# Patient Record
Sex: MALE | Race: WHITE | NOT HISPANIC OR LATINO | URBAN - METROPOLITAN AREA
[De-identification: names, ages, dates, MRNs, and addresses within clinical notes are randomized per-mention and may not be internally consistent; named-entity substitution may affect disease eponyms.]

---

## 2017-05-01 ENCOUNTER — OUTPATIENT (OUTPATIENT)
Dept: OUTPATIENT SERVICES | Facility: HOSPITAL | Age: 52
LOS: 1 days | End: 2017-05-01
Payer: COMMERCIAL

## 2017-05-01 VITALS
HEIGHT: 72 IN | HEART RATE: 65 BPM | WEIGHT: 315 LBS | RESPIRATION RATE: 16 BRPM | DIASTOLIC BLOOD PRESSURE: 70 MMHG | SYSTOLIC BLOOD PRESSURE: 130 MMHG | OXYGEN SATURATION: 98 % | TEMPERATURE: 98 F

## 2017-05-01 DIAGNOSIS — Z01.818 ENCOUNTER FOR OTHER PREPROCEDURAL EXAMINATION: ICD-10-CM

## 2017-05-01 DIAGNOSIS — S46.001A UNSPECIFIED INJURY OF MUSCLE(S) AND TENDON(S) OF THE ROTATOR CUFF OF RIGHT SHOULDER, INITIAL ENCOUNTER: ICD-10-CM

## 2017-05-01 DIAGNOSIS — S46.002A UNSPECIFIED INJURY OF MUSCLE(S) AND TENDON(S) OF THE ROTATOR CUFF OF LEFT SHOULDER, INITIAL ENCOUNTER: ICD-10-CM

## 2017-05-01 DIAGNOSIS — Z98.890 OTHER SPECIFIED POSTPROCEDURAL STATES: Chronic | ICD-10-CM

## 2017-05-01 PROCEDURE — 85027 COMPLETE CBC AUTOMATED: CPT

## 2017-05-01 PROCEDURE — 80048 BASIC METABOLIC PNL TOTAL CA: CPT

## 2017-05-01 PROCEDURE — 85610 PROTHROMBIN TIME: CPT

## 2017-05-01 PROCEDURE — G0463: CPT

## 2017-05-01 PROCEDURE — 85730 THROMBOPLASTIN TIME PARTIAL: CPT

## 2017-05-01 NOTE — H&P PST ADULT - DOES PATIENT HAVE ADVANCE DIRECTIVE
No/in case of emergency call sister Dunia [phone], she is to make medical decisions about patient

## 2017-05-01 NOTE — H&P PST ADULT - NSANTHOSAYNRD_GEN_A_CORE
pt advised to see pulmonology to be assessed for sleep apnea/No. CAMILLE screening performed.  STOP BANG Legend: 0-2 = LOW Risk; 3-4 = INTERMEDIATE Risk; 5-8 = HIGH Risk pt advised to see pulmonology to be assessed for sleep apnea, pt seen By Anesthesia, Dr. Lee, she discussed risk factors for CAMILLE with patient/No. CAMILLE screening performed.  STOP BANG Legend: 0-2 = LOW Risk; 3-4 = INTERMEDIATE Risk; 5-8 = HIGH Risk

## 2017-05-01 NOTE — H&P PST ADULT - PMH
Gout    HLD (hyperlipidemia)    Hypertension    Morbid obesity with BMI of 45.0-49.9, adult    CAMILLE (obstructive sleep apnea)  SANDIP RISK FOR  Vitamin B12 deficiency    Vitamin D deficiency

## 2017-05-01 NOTE — H&P PST ADULT - MUSCULOSKELETAL
details… detailed exam no joint swelling/no calf tenderness/decreased ROM due to pain/no joint warmth/no joint erythema/right shoulder

## 2017-05-01 NOTE — H&P PST ADULT - PROBLEM SELECTOR PLAN 1
Patient is scheduled for right shoulder arthroscopy and right shoulder rotator cuff repair on 5/4/17. Patient is at moderate risk for this surgery.

## 2017-05-01 NOTE — H&P PST ADULT - HISTORY OF PRESENT ILLNESS
52 years old male presented with right shoulder pain and limited ROM , s/p fall at work on December 2016. Diagnosed with injury of muscles and tendons of  right rotator cuff and is  scheduled for right shoulder arthroscopy and right shoulder rotator cuff repair on 5/4/17.

## 2017-05-03 RX ORDER — CELECOXIB 200 MG/1
200 CAPSULE ORAL ONCE
Qty: 0 | Refills: 0 | Status: COMPLETED | OUTPATIENT
Start: 2017-05-04 | End: 2017-05-04

## 2017-05-03 RX ORDER — ACETAMINOPHEN 500 MG
975 TABLET ORAL ONCE
Qty: 0 | Refills: 0 | Status: COMPLETED | OUTPATIENT
Start: 2017-05-04 | End: 2017-05-04

## 2017-05-03 RX ORDER — SODIUM CHLORIDE 9 MG/ML
3 INJECTION INTRAMUSCULAR; INTRAVENOUS; SUBCUTANEOUS EVERY 8 HOURS
Qty: 0 | Refills: 0 | Status: DISCONTINUED | OUTPATIENT
Start: 2017-05-04 | End: 2017-05-04

## 2017-05-04 ENCOUNTER — INPATIENT (INPATIENT)
Facility: HOSPITAL | Age: 52
LOS: 0 days | Discharge: HOME | DRG: 309 | End: 2017-05-05
Attending: INTERNAL MEDICINE | Admitting: INTERNAL MEDICINE
Payer: COMMERCIAL

## 2017-05-04 VITALS
OXYGEN SATURATION: 99 % | WEIGHT: 315 LBS | DIASTOLIC BLOOD PRESSURE: 94 MMHG | RESPIRATION RATE: 18 BRPM | HEART RATE: 78 BPM | HEIGHT: 72 IN | SYSTOLIC BLOOD PRESSURE: 138 MMHG | TEMPERATURE: 98 F

## 2017-05-04 DIAGNOSIS — I10 ESSENTIAL (PRIMARY) HYPERTENSION: ICD-10-CM

## 2017-05-04 DIAGNOSIS — S46.001A UNSPECIFIED INJURY OF MUSCLE(S) AND TENDON(S) OF THE ROTATOR CUFF OF RIGHT SHOULDER, INITIAL ENCOUNTER: ICD-10-CM

## 2017-05-04 DIAGNOSIS — M75.100 UNSPECIFIED ROTATOR CUFF TEAR OR RUPTURE OF UNSPECIFIED SHOULDER, NOT SPECIFIED AS TRAUMATIC: ICD-10-CM

## 2017-05-04 DIAGNOSIS — Z01.818 ENCOUNTER FOR OTHER PREPROCEDURAL EXAMINATION: ICD-10-CM

## 2017-05-04 DIAGNOSIS — Z29.9 ENCOUNTER FOR PROPHYLACTIC MEASURES, UNSPECIFIED: ICD-10-CM

## 2017-05-04 DIAGNOSIS — I48.91 UNSPECIFIED ATRIAL FIBRILLATION: ICD-10-CM

## 2017-05-04 DIAGNOSIS — Z98.890 OTHER SPECIFIED POSTPROCEDURAL STATES: Chronic | ICD-10-CM

## 2017-05-04 DIAGNOSIS — E78.5 HYPERLIPIDEMIA, UNSPECIFIED: ICD-10-CM

## 2017-05-04 LAB
ALBUMIN SERPL ELPH-MCNC: 3.7 G/DL — SIGNIFICANT CHANGE UP (ref 3.5–5)
ALP SERPL-CCNC: 55 U/L — SIGNIFICANT CHANGE UP (ref 40–120)
ALT FLD-CCNC: 26 U/L DA — SIGNIFICANT CHANGE UP (ref 10–60)
ANION GAP SERPL CALC-SCNC: 8 MMOL/L — SIGNIFICANT CHANGE UP (ref 5–17)
AST SERPL-CCNC: 18 U/L — SIGNIFICANT CHANGE UP (ref 10–40)
BILIRUB SERPL-MCNC: 1.1 MG/DL — SIGNIFICANT CHANGE UP (ref 0.2–1.2)
BUN SERPL-MCNC: 15 MG/DL — SIGNIFICANT CHANGE UP (ref 7–18)
CALCIUM SERPL-MCNC: 9.2 MG/DL — SIGNIFICANT CHANGE UP (ref 8.4–10.5)
CHLORIDE SERPL-SCNC: 104 MMOL/L — SIGNIFICANT CHANGE UP (ref 96–108)
CK MB BLD-MCNC: 1.2 % — SIGNIFICANT CHANGE UP (ref 0–3.5)
CK MB CFR SERPL CALC: 1.4 NG/ML — SIGNIFICANT CHANGE UP (ref 0–3.6)
CK SERPL-CCNC: 115 U/L — SIGNIFICANT CHANGE UP (ref 35–232)
CO2 SERPL-SCNC: 26 MMOL/L — SIGNIFICANT CHANGE UP (ref 22–31)
CREAT SERPL-MCNC: 1.11 MG/DL — SIGNIFICANT CHANGE UP (ref 0.5–1.3)
GLUCOSE SERPL-MCNC: 134 MG/DL — HIGH (ref 70–99)
HCT VFR BLD CALC: 40.6 % — SIGNIFICANT CHANGE UP (ref 39–50)
HGB BLD-MCNC: 14.1 G/DL — SIGNIFICANT CHANGE UP (ref 13–17)
MAGNESIUM SERPL-MCNC: 1.8 MG/DL — SIGNIFICANT CHANGE UP (ref 1.8–2.4)
MCHC RBC-ENTMCNC: 32.3 PG — SIGNIFICANT CHANGE UP (ref 27–34)
MCHC RBC-ENTMCNC: 34.7 GM/DL — SIGNIFICANT CHANGE UP (ref 32–36)
MCV RBC AUTO: 93 FL — SIGNIFICANT CHANGE UP (ref 80–100)
PLATELET # BLD AUTO: 197 K/UL — SIGNIFICANT CHANGE UP (ref 150–400)
POTASSIUM SERPL-MCNC: 3.6 MMOL/L — SIGNIFICANT CHANGE UP (ref 3.5–5.3)
POTASSIUM SERPL-SCNC: 3.6 MMOL/L — SIGNIFICANT CHANGE UP (ref 3.5–5.3)
PROT SERPL-MCNC: 7.3 G/DL — SIGNIFICANT CHANGE UP (ref 6–8.3)
RBC # BLD: 4.36 M/UL — SIGNIFICANT CHANGE UP (ref 4.2–5.8)
RBC # FLD: 12.1 % — SIGNIFICANT CHANGE UP (ref 10.3–14.5)
SODIUM SERPL-SCNC: 138 MMOL/L — SIGNIFICANT CHANGE UP (ref 135–145)
TROPONIN I SERPL-MCNC: <0.015 NG/ML — SIGNIFICANT CHANGE UP (ref 0–0.04)
TROPONIN I SERPL-MCNC: <0.015 NG/ML — SIGNIFICANT CHANGE UP (ref 0–0.04)
TSH SERPL-MCNC: 1.67 UU/ML — SIGNIFICANT CHANGE UP (ref 0.34–4.82)
WBC # BLD: 9.3 K/UL — SIGNIFICANT CHANGE UP (ref 3.8–10.5)
WBC # FLD AUTO: 9.3 K/UL — SIGNIFICANT CHANGE UP (ref 3.8–10.5)

## 2017-05-04 RX ORDER — METOPROLOL TARTRATE 50 MG
5 TABLET ORAL EVERY 6 HOURS
Qty: 0 | Refills: 0 | Status: DISCONTINUED | OUTPATIENT
Start: 2017-05-04 | End: 2017-05-04

## 2017-05-04 RX ORDER — METOPROLOL TARTRATE 50 MG
5 TABLET ORAL ONCE
Qty: 0 | Refills: 0 | Status: COMPLETED | OUTPATIENT
Start: 2017-05-04 | End: 2017-05-04

## 2017-05-04 RX ORDER — ALLOPURINOL 300 MG
300 TABLET ORAL DAILY
Qty: 0 | Refills: 0 | Status: DISCONTINUED | OUTPATIENT
Start: 2017-05-04 | End: 2017-05-05

## 2017-05-04 RX ORDER — AMLODIPINE BESYLATE 2.5 MG/1
10 TABLET ORAL DAILY
Qty: 0 | Refills: 0 | Status: DISCONTINUED | OUTPATIENT
Start: 2017-05-04 | End: 2017-05-05

## 2017-05-04 RX ORDER — METOPROLOL TARTRATE 50 MG
25 TABLET ORAL EVERY 8 HOURS
Qty: 0 | Refills: 0 | Status: DISCONTINUED | OUTPATIENT
Start: 2017-05-04 | End: 2017-05-05

## 2017-05-04 RX ORDER — SODIUM CHLORIDE 9 MG/ML
1000 INJECTION, SOLUTION INTRAVENOUS
Qty: 0 | Refills: 0 | Status: DISCONTINUED | OUTPATIENT
Start: 2017-05-04 | End: 2017-05-04

## 2017-05-04 RX ORDER — ASPIRIN/CALCIUM CARB/MAGNESIUM 324 MG
81 TABLET ORAL DAILY
Qty: 0 | Refills: 0 | Status: DISCONTINUED | OUTPATIENT
Start: 2017-05-04 | End: 2017-05-04

## 2017-05-04 RX ORDER — DIGOXIN 250 MCG
0.5 TABLET ORAL ONCE
Qty: 0 | Refills: 0 | Status: COMPLETED | OUTPATIENT
Start: 2017-05-04 | End: 2017-05-04

## 2017-05-04 RX ORDER — ENOXAPARIN SODIUM 100 MG/ML
160 INJECTION SUBCUTANEOUS EVERY 12 HOURS
Qty: 0 | Refills: 0 | Status: DISCONTINUED | OUTPATIENT
Start: 2017-05-04 | End: 2017-05-04

## 2017-05-04 RX ORDER — ENOXAPARIN SODIUM 100 MG/ML
40 INJECTION SUBCUTANEOUS DAILY
Qty: 0 | Refills: 0 | Status: DISCONTINUED | OUTPATIENT
Start: 2017-05-04 | End: 2017-05-04

## 2017-05-04 RX ORDER — LISINOPRIL 2.5 MG/1
20 TABLET ORAL DAILY
Qty: 0 | Refills: 0 | Status: DISCONTINUED | OUTPATIENT
Start: 2017-05-04 | End: 2017-05-05

## 2017-05-04 RX ORDER — DIGOXIN 250 MCG
0.25 TABLET ORAL DAILY
Qty: 0 | Refills: 0 | Status: DISCONTINUED | OUTPATIENT
Start: 2017-05-05 | End: 2017-05-05

## 2017-05-04 RX ORDER — ASPIRIN/CALCIUM CARB/MAGNESIUM 324 MG
81 TABLET ORAL DAILY
Qty: 0 | Refills: 0 | Status: DISCONTINUED | OUTPATIENT
Start: 2017-05-04 | End: 2017-05-05

## 2017-05-04 RX ORDER — DIGOXIN 250 MCG
0.25 TABLET ORAL EVERY 6 HOURS
Qty: 0 | Refills: 0 | Status: COMPLETED | OUTPATIENT
Start: 2017-05-04 | End: 2017-05-05

## 2017-05-04 RX ORDER — ENOXAPARIN SODIUM 100 MG/ML
150 INJECTION SUBCUTANEOUS EVERY 12 HOURS
Qty: 0 | Refills: 0 | Status: DISCONTINUED | OUTPATIENT
Start: 2017-05-04 | End: 2017-05-05

## 2017-05-04 RX ADMIN — Medication 25 MILLIGRAM(S): at 21:11

## 2017-05-04 RX ADMIN — Medication 975 MILLIGRAM(S): at 07:22

## 2017-05-04 RX ADMIN — ENOXAPARIN SODIUM 150 MILLIGRAM(S): 100 INJECTION SUBCUTANEOUS at 16:41

## 2017-05-04 RX ADMIN — Medication 81 MILLIGRAM(S): at 16:41

## 2017-05-04 RX ADMIN — Medication 300 MILLIGRAM(S): at 21:47

## 2017-05-04 RX ADMIN — CELECOXIB 200 MILLIGRAM(S): 200 CAPSULE ORAL at 07:22

## 2017-05-04 RX ADMIN — Medication 5 MILLIGRAM(S): at 10:25

## 2017-05-04 RX ADMIN — Medication 25 MILLIGRAM(S): at 16:41

## 2017-05-04 RX ADMIN — SODIUM CHLORIDE 3 MILLILITER(S): 9 INJECTION INTRAMUSCULAR; INTRAVENOUS; SUBCUTANEOUS at 07:22

## 2017-05-04 RX ADMIN — Medication 0.25 MILLIGRAM(S): at 21:47

## 2017-05-04 RX ADMIN — Medication 0.5 MILLIGRAM(S): at 16:41

## 2017-05-04 NOTE — H&P ADULT. - HISTORY OF PRESENT ILLNESS
52 M from home, lives alone , PMH of arthritis , HLD , HTN, obesity admitted for ambulatory surgery for arthroscopy of right shoulder  for  rotator cuff tear ( s/p fall in december ). In OR pt found to have new onset Afib with -150 in OR and /105. Pt reported he has had feeling of flutter in chest in the past as well, not related to any activity. Denied any exertional chest pain or SOB. He had Echo and ECG done with the PCP prior to procedure for clearance.   He is a non smoker, second hand smoking till age of 26.

## 2017-05-04 NOTE — H&P ADULT. - PROBLEM SELECTOR PLAN 1
atrial fibrillation with RVR   Echo done at PMD office showed EF 55-60%   Will start lopressor 5 mg IV Q6 for rate control   TONIA-vasc score of 1, continue with aspirin atrial fibrillation with RVR   Echo done at PMD office showed EF 55-60%   Will start lopressor 5 mg IV Q6 for rate control   TONIA-vasc score of 1, continue with aspirin  Addendum : D/w Dr. Rodriguez , starting lopressor 25 Q8 PO, digoxin loading   full dose anticoagulation with Lovenox , stress test in am

## 2017-05-04 NOTE — H&P ADULT. - ASSESSMENT
52 M from home, lives alone , PMH of arthritis , HLD , HTN, obesity admitted for ambulatory surgery for arthroscopy of right shoulder  for  rotator cuff tear ( s/p fall in december ). In OR pt found to have new onset Afib with -150 in OR and /105. Pt reported he has had feeling of flutter in chest in the past as well, not related to any activity. Denied any exertional chest pain or SOB. He had Echo and ECG done with the PCP prior to procedure for clearance.

## 2017-05-04 NOTE — H&P ADULT. - PROBLEM SELECTOR PLAN 3
Out pt cholesterol level of 209 as per pt   On diet control , not on any medication   follow up lipid profile

## 2017-05-05 VITALS
HEART RATE: 93 BPM | DIASTOLIC BLOOD PRESSURE: 88 MMHG | TEMPERATURE: 98 F | SYSTOLIC BLOOD PRESSURE: 129 MMHG | OXYGEN SATURATION: 98 % | RESPIRATION RATE: 18 BRPM

## 2017-05-05 LAB
24R-OH-CALCIDIOL SERPL-MCNC: 9.1 NG/ML — LOW (ref 30–100)
ALBUMIN SERPL ELPH-MCNC: 3.4 G/DL — LOW (ref 3.5–5)
ALP SERPL-CCNC: 50 U/L — SIGNIFICANT CHANGE UP (ref 40–120)
ALT FLD-CCNC: 27 U/L DA — SIGNIFICANT CHANGE UP (ref 10–60)
ANION GAP SERPL CALC-SCNC: 8 MMOL/L — SIGNIFICANT CHANGE UP (ref 5–17)
AST SERPL-CCNC: 22 U/L — SIGNIFICANT CHANGE UP (ref 10–40)
BASOPHILS # BLD AUTO: 0.1 K/UL — SIGNIFICANT CHANGE UP (ref 0–0.2)
BASOPHILS NFR BLD AUTO: 0.8 % — SIGNIFICANT CHANGE UP (ref 0–2)
BILIRUB SERPL-MCNC: 1.3 MG/DL — HIGH (ref 0.2–1.2)
BUN SERPL-MCNC: 13 MG/DL — SIGNIFICANT CHANGE UP (ref 7–18)
CALCIUM SERPL-MCNC: 8.9 MG/DL — SIGNIFICANT CHANGE UP (ref 8.4–10.5)
CHLORIDE SERPL-SCNC: 102 MMOL/L — SIGNIFICANT CHANGE UP (ref 96–108)
CHOLEST SERPL-MCNC: 238 MG/DL — HIGH (ref 10–199)
CK MB BLD-MCNC: 1.2 % — SIGNIFICANT CHANGE UP (ref 0–3.5)
CK MB CFR SERPL CALC: 3.1 NG/ML — SIGNIFICANT CHANGE UP (ref 0–3.6)
CK SERPL-CCNC: 263 U/L — HIGH (ref 35–232)
CO2 SERPL-SCNC: 27 MMOL/L — SIGNIFICANT CHANGE UP (ref 22–31)
CREAT SERPL-MCNC: 0.86 MG/DL — SIGNIFICANT CHANGE UP (ref 0.5–1.3)
EOSINOPHIL # BLD AUTO: 0.1 K/UL — SIGNIFICANT CHANGE UP (ref 0–0.5)
EOSINOPHIL NFR BLD AUTO: 2 % — SIGNIFICANT CHANGE UP (ref 0–6)
GLUCOSE SERPL-MCNC: 120 MG/DL — HIGH (ref 70–99)
HBA1C BLD-MCNC: 5.8 % — HIGH (ref 4–5.6)
HCT VFR BLD CALC: 38.7 % — LOW (ref 39–50)
HDLC SERPL-MCNC: 30 MG/DL — LOW (ref 40–125)
HGB BLD-MCNC: 13.3 G/DL — SIGNIFICANT CHANGE UP (ref 13–17)
LIPID PNL WITH DIRECT LDL SERPL: 108 MG/DL — SIGNIFICANT CHANGE UP
LYMPHOCYTES # BLD AUTO: 1.8 K/UL — SIGNIFICANT CHANGE UP (ref 1–3.3)
LYMPHOCYTES # BLD AUTO: 26.4 % — SIGNIFICANT CHANGE UP (ref 13–44)
MAGNESIUM SERPL-MCNC: 2.1 MG/DL — SIGNIFICANT CHANGE UP (ref 1.8–2.4)
MCHC RBC-ENTMCNC: 32.2 PG — SIGNIFICANT CHANGE UP (ref 27–34)
MCHC RBC-ENTMCNC: 34.5 GM/DL — SIGNIFICANT CHANGE UP (ref 32–36)
MCV RBC AUTO: 93.4 FL — SIGNIFICANT CHANGE UP (ref 80–100)
MONOCYTES # BLD AUTO: 0.6 K/UL — SIGNIFICANT CHANGE UP (ref 0–0.9)
MONOCYTES NFR BLD AUTO: 9.1 % — SIGNIFICANT CHANGE UP (ref 2–14)
NEUTROPHILS # BLD AUTO: 4.1 K/UL — SIGNIFICANT CHANGE UP (ref 1.8–7.4)
NEUTROPHILS NFR BLD AUTO: 61.7 % — SIGNIFICANT CHANGE UP (ref 43–77)
PHOSPHATE SERPL-MCNC: 3 MG/DL — SIGNIFICANT CHANGE UP (ref 2.5–4.5)
PLATELET # BLD AUTO: 163 K/UL — SIGNIFICANT CHANGE UP (ref 150–400)
POTASSIUM SERPL-MCNC: 3.7 MMOL/L — SIGNIFICANT CHANGE UP (ref 3.5–5.3)
POTASSIUM SERPL-SCNC: 3.7 MMOL/L — SIGNIFICANT CHANGE UP (ref 3.5–5.3)
PROT SERPL-MCNC: 6.8 G/DL — SIGNIFICANT CHANGE UP (ref 6–8.3)
RBC # BLD: 4.14 M/UL — LOW (ref 4.2–5.8)
RBC # FLD: 12.2 % — SIGNIFICANT CHANGE UP (ref 10.3–14.5)
SODIUM SERPL-SCNC: 137 MMOL/L — SIGNIFICANT CHANGE UP (ref 135–145)
TOTAL CHOLESTEROL/HDL RATIO MEASUREMENT: 7.9 RATIO — SIGNIFICANT CHANGE UP (ref 3.4–9.6)
TRIGL SERPL-MCNC: 498 MG/DL — HIGH (ref 10–149)
TROPONIN I SERPL-MCNC: <0.015 NG/ML — SIGNIFICANT CHANGE UP (ref 0–0.04)
TSH SERPL-MCNC: 2.1 UU/ML — SIGNIFICANT CHANGE UP (ref 0.34–4.82)
WBC # BLD: 6.7 K/UL — SIGNIFICANT CHANGE UP (ref 3.8–10.5)
WBC # FLD AUTO: 6.7 K/UL — SIGNIFICANT CHANGE UP (ref 3.8–10.5)

## 2017-05-05 PROCEDURE — 93005 ELECTROCARDIOGRAM TRACING: CPT

## 2017-05-05 PROCEDURE — 82553 CREATINE MB FRACTION: CPT

## 2017-05-05 PROCEDURE — 84484 ASSAY OF TROPONIN QUANT: CPT

## 2017-05-05 PROCEDURE — 82306 VITAMIN D 25 HYDROXY: CPT

## 2017-05-05 PROCEDURE — 83036 HEMOGLOBIN GLYCOSYLATED A1C: CPT

## 2017-05-05 PROCEDURE — 84100 ASSAY OF PHOSPHORUS: CPT

## 2017-05-05 PROCEDURE — 80053 COMPREHEN METABOLIC PANEL: CPT

## 2017-05-05 PROCEDURE — 71010: CPT | Mod: 26

## 2017-05-05 PROCEDURE — 82550 ASSAY OF CK (CPK): CPT

## 2017-05-05 PROCEDURE — 84443 ASSAY THYROID STIM HORMONE: CPT

## 2017-05-05 PROCEDURE — 83735 ASSAY OF MAGNESIUM: CPT

## 2017-05-05 PROCEDURE — 80061 LIPID PANEL: CPT

## 2017-05-05 PROCEDURE — 85027 COMPLETE CBC AUTOMATED: CPT

## 2017-05-05 PROCEDURE — 71045 X-RAY EXAM CHEST 1 VIEW: CPT

## 2017-05-05 RX ORDER — DIGOXIN 250 MCG
1 TABLET ORAL
Qty: 30 | Refills: 0 | OUTPATIENT
Start: 2017-05-05 | End: 2017-06-04

## 2017-05-05 RX ORDER — GEMFIBROZIL 600 MG
600 TABLET ORAL
Qty: 0 | Refills: 0 | Status: DISCONTINUED | OUTPATIENT
Start: 2017-05-05 | End: 2017-05-05

## 2017-05-05 RX ORDER — APIXABAN 2.5 MG/1
1 TABLET, FILM COATED ORAL
Qty: 60 | Refills: 0 | OUTPATIENT
Start: 2017-05-05 | End: 2017-06-04

## 2017-05-05 RX ORDER — ALLOPURINOL 300 MG
1 TABLET ORAL
Qty: 30 | Refills: 0 | OUTPATIENT
Start: 2017-05-05 | End: 2017-06-04

## 2017-05-05 RX ORDER — METOPROLOL TARTRATE 50 MG
50 TABLET ORAL
Qty: 0 | Refills: 0 | Status: DISCONTINUED | OUTPATIENT
Start: 2017-05-05 | End: 2017-05-05

## 2017-05-05 RX ORDER — SIMVASTATIN 20 MG/1
1 TABLET, FILM COATED ORAL
Qty: 30 | Refills: 0 | OUTPATIENT
Start: 2017-05-05 | End: 2017-06-04

## 2017-05-05 RX ORDER — METOPROLOL TARTRATE 50 MG
1 TABLET ORAL
Qty: 60 | Refills: 0 | OUTPATIENT
Start: 2017-05-05 | End: 2017-06-04

## 2017-05-05 RX ORDER — SIMVASTATIN 20 MG/1
10 TABLET, FILM COATED ORAL AT BEDTIME
Qty: 0 | Refills: 0 | Status: DISCONTINUED | OUTPATIENT
Start: 2017-05-05 | End: 2017-05-05

## 2017-05-05 RX ORDER — ALLOPURINOL 300 MG
1 TABLET ORAL
Qty: 0 | Refills: 0 | COMMUNITY

## 2017-05-05 RX ORDER — GEMFIBROZIL 600 MG
1 TABLET ORAL
Qty: 60 | Refills: 0 | OUTPATIENT
Start: 2017-05-05 | End: 2017-06-04

## 2017-05-05 RX ORDER — AMLODIPINE BESYLATE 2.5 MG/1
1 TABLET ORAL
Qty: 0 | Refills: 0 | COMMUNITY

## 2017-05-05 RX ADMIN — Medication 600 MILLIGRAM(S): at 16:48

## 2017-05-05 RX ADMIN — LISINOPRIL 20 MILLIGRAM(S): 2.5 TABLET ORAL at 05:06

## 2017-05-05 RX ADMIN — AMLODIPINE BESYLATE 10 MILLIGRAM(S): 2.5 TABLET ORAL at 05:04

## 2017-05-05 RX ADMIN — Medication 0.25 MILLIGRAM(S): at 05:04

## 2017-05-05 RX ADMIN — ENOXAPARIN SODIUM 150 MILLIGRAM(S): 100 INJECTION SUBCUTANEOUS at 05:06

## 2017-05-05 RX ADMIN — Medication 25 MILLIGRAM(S): at 05:05

## 2017-05-05 RX ADMIN — Medication 81 MILLIGRAM(S): at 12:50

## 2017-05-05 NOTE — DISCHARGE NOTE ADULT - PLAN OF CARE
Rate control, anticoagulate and follow up with Dr. Rodriguez You were diagnosed with new onset atrial fibrillation which has no valvular component as per your pre-op echocardiogram. Your atrial fibrillation is controlled on your new dose of Metoprolol, Digoxin which has been keeping your ventricular rate less than 100. Please anticoagulate to prevent stroke with Eliquis, which you should take as prescribed and do not miss any doses. Please follow up with Dr. Rodriguez at her office for follow up care. Control blood pressure Please take lopressor which has been controlling your blood pressure well. Follow up with your PCP or Dr. Rodriguez for regular blood pressure checks and medication adjustments. Please take Zocor and Lopid to control your cholesterol and triglycerides which are both elevated. Please follow up with regular blood tests every month with your PCP or with Dr. Rodriguez. Control lipids Take allopurinol to avoid exacerbation, take Naproxen in case of exacerbation.

## 2017-05-05 NOTE — DISCHARGE NOTE ADULT - PATIENT PORTAL LINK FT
“You can access the [facility] Patient Portal, offered by [facility], by registering with the following website: [email or web address]”

## 2017-05-05 NOTE — DISCHARGE NOTE ADULT - CARE PLAN
Principal Discharge DX:	New onset atrial fibrillation  Goal:	Rate control, anticoagulate and follow up with Dr. Rodriguez  Instructions for follow-up, activity and diet:	You were diagnosed with new onset atrial fibrillation which has no valvular component as per your pre-op echocardiogram. Your atrial fibrillation is controlled on your new dose of Metoprolol, Digoxin which has been keeping your ventricular rate less than 100. Please anticoagulate to prevent stroke with Eliquis, which you should take as prescribed and do not miss any doses. Please follow up with Dr. Rodriguez at her office for follow up care.  Secondary Diagnosis:	Hypertension  Goal:	Control blood pressure  Instructions for follow-up, activity and diet:	Please take lopressor which has been controlling your blood pressure well. Follow up with your PCP or Dr. Rodriguez for regular blood pressure checks and medication adjustments.  Secondary Diagnosis:	HLD (hyperlipidemia)  Goal:	Control lipids  Instructions for follow-up, activity and diet:	Please take Zocor and Lopid to control your cholesterol and triglycerides which are both elevated. Please follow up with regular blood tests every month with your PCP or with Dr. Rodriguez.  Secondary Diagnosis:	Gout  Goal:	Take allopurinol to avoid exacerbation, take Naproxen in case of exacerbation.

## 2017-05-05 NOTE — DISCHARGE NOTE ADULT - MEDICATION SUMMARY - MEDICATIONS TO STOP TAKING
I will STOP taking the medications listed below when I get home from the hospital:    amLODIPine 10 mg oral tablet  -- 1 tab(s) by mouth once a day    lisinopril-hydrochlorothiazide 20mg-12.5mg oral tablet  -- 1 tab(s) by mouth once a day

## 2017-05-05 NOTE — DISCHARGE NOTE ADULT - CARE PROVIDER_API CALL
Zina Rodriguez), Cardiology; Internal Medicine  [address] [place] [id number]  Phone: [phone]  Fax: [phone]

## 2017-05-05 NOTE — DISCHARGE NOTE ADULT - MEDICATION SUMMARY - MEDICATIONS TO TAKE
I will START or STAY ON the medications listed below when I get home from the hospital:    aspirin 81 mg oral tablet, chewable  -- 1 tab(s) by mouth once a day  -- Indication: For Atrial fibrillation    naproxen sodium 220 mg oral capsule  -- 1 cap(s) by mouth every 12 hours, As Needed  -- Indication: For Shoulder Pain    digoxin 250 mcg (0.25 mg) oral tablet  -- 1 tab(s) by mouth once a day  -- Indication: For Atrial fibrillation    Eliquis 5 mg oral tablet  -- 1 tab(s) by mouth 2 times a day  -- Check with your doctor before becoming pregnant.  It is very important that you take or use this exactly as directed.  Do not skip doses or discontinue unless directed by your doctor.  Obtain medical advice before taking any non-prescription drugs as some may affect the action of this medication.    -- Indication: For Atrial fibrillation    allopurinol 300 mg oral tablet  -- 1 tab(s) by mouth once a day  -- Indication: For Gout    gemfibrozil 600 mg oral tablet  -- 1 tab(s) by mouth 2 times a day (before meals)  -- Indication: For HLD (hyperlipidemia)    simvastatin 10 mg oral tablet  -- 1 tab(s) by mouth once a day (at bedtime)  -- Indication: For HLD (hyperlipidemia)    metoprolol tartrate 50 mg oral tablet  -- 1 tab(s) by mouth 2 times a day  -- Indication: For Atrial fibrillation    Vitamin B-12 1000 mcg oral tablet  -- 1 tab(s) by mouth once a day  -- Indication: For Prophylactic measure    Vitamin D2 50,000 intl units (1.25 mg) oral capsule  -- 1 cap(s) by mouth once a week  -- Indication: For Prophylactic measure

## 2017-05-05 NOTE — DISCHARGE NOTE ADULT - HOSPITAL COURSE
52 M from home, lives alone , PMH of arthritis , HLD , HTN, obesity admitted for ambulatory surgery for arthroscopy of right shoulder  for  rotator cuff tear ( s/p fall in december ). In OR pt found to have new onset Afib with -150 in OR and /105. Pt reported he has had feeling of flutter in chest in the past as well, not related to any activity. Denied any exertional chest pain or SOB. He had Echo and ECG done with the PCP prior to procedure for clearance.   He is a non smoker, second hand smoking till age of 26.    In the hospital, patient was admitted to medicine after he was found to have atrial fibrillation with rapid ventricular response in the OR. Arthroscopy was cancelled. Patient was evaluated by Dr. Rodriguez (cardiologist). Patient was started on treatment with Digoxin and Metoprolol to control the abnormal heart rate. Patient was initially anticoagulated with Lovenox. As per the pre-operation evaluation, patient had normal heart valves so decision was made to change anticoagulation from subcutaneous Lovenox injections to oral Eliquis. Patient was planned for a nuclear stress test to evaluate for coronary artery disease as a possible cause of the new atrial fibrillation but patient could not be tested for technical reasons, stress test will be done as outpatient. Blood pressure was evaluated and well controlled on the Lopressor that also controls the atrial fibrillation heart rate. Patient was started on Zocor and Lopid to control his hypertriglyceridemia and hypercholesterolemia. Arthroscopy will be scheduled for a different time after resolution/stabilization of the atrial fibrillation. Allopurinol was continued for gout. Patient is now medically stable for discharge with outpatient follow up. Plan discussed and agreed with Dr. Rodriguez.

## 2017-05-10 DIAGNOSIS — M19.90 UNSPECIFIED OSTEOARTHRITIS, UNSPECIFIED SITE: ICD-10-CM

## 2017-05-10 DIAGNOSIS — I48.91 UNSPECIFIED ATRIAL FIBRILLATION: ICD-10-CM

## 2017-05-10 DIAGNOSIS — Z88.1 ALLERGY STATUS TO OTHER ANTIBIOTIC AGENTS STATUS: ICD-10-CM

## 2017-05-10 DIAGNOSIS — Z53.8 PROCEDURE AND TREATMENT NOT CARRIED OUT FOR OTHER REASONS: ICD-10-CM

## 2017-05-10 DIAGNOSIS — M10.9 GOUT, UNSPECIFIED: ICD-10-CM

## 2017-05-10 DIAGNOSIS — E66.9 OBESITY, UNSPECIFIED: ICD-10-CM

## 2017-05-10 DIAGNOSIS — M75.101 UNSPECIFIED ROTATOR CUFF TEAR OR RUPTURE OF RIGHT SHOULDER, NOT SPECIFIED AS TRAUMATIC: ICD-10-CM

## 2017-05-10 DIAGNOSIS — G47.33 OBSTRUCTIVE SLEEP APNEA (ADULT) (PEDIATRIC): ICD-10-CM

## 2017-05-10 DIAGNOSIS — I10 ESSENTIAL (PRIMARY) HYPERTENSION: ICD-10-CM

## 2017-05-10 DIAGNOSIS — E78.1 PURE HYPERGLYCERIDEMIA: ICD-10-CM

## 2017-05-10 DIAGNOSIS — E78.00 PURE HYPERCHOLESTEROLEMIA, UNSPECIFIED: ICD-10-CM

## 2017-05-26 RX ORDER — ASPIRIN/CALCIUM CARB/MAGNESIUM 324 MG
1 TABLET ORAL
Qty: 30 | Refills: 0 | OUTPATIENT
Start: 2017-05-26

## 2017-11-16 PROBLEM — Z00.00 ENCOUNTER FOR PREVENTIVE HEALTH EXAMINATION: Status: ACTIVE | Noted: 2017-11-16

## 2017-12-01 ENCOUNTER — APPOINTMENT (OUTPATIENT)
Dept: CV DIAGNOSITCS | Facility: HOSPITAL | Age: 52
End: 2017-12-01

## 2018-02-16 ENCOUNTER — OUTPATIENT (OUTPATIENT)
Dept: OUTPATIENT SERVICES | Facility: HOSPITAL | Age: 53
LOS: 1 days | End: 2018-02-16
Payer: COMMERCIAL

## 2018-02-16 DIAGNOSIS — Z01.818 ENCOUNTER FOR OTHER PREPROCEDURAL EXAMINATION: ICD-10-CM

## 2018-02-16 DIAGNOSIS — I48.91 UNSPECIFIED ATRIAL FIBRILLATION: ICD-10-CM

## 2018-02-16 DIAGNOSIS — Z98.890 OTHER SPECIFIED POSTPROCEDURAL STATES: Chronic | ICD-10-CM

## 2018-02-16 DIAGNOSIS — S46.001A UNSPECIFIED INJURY OF MUSCLE(S) AND TENDON(S) OF THE ROTATOR CUFF OF RIGHT SHOULDER, INITIAL ENCOUNTER: ICD-10-CM

## 2018-02-25 PROCEDURE — 93320 DOPPLER ECHO COMPLETE: CPT

## 2018-02-25 PROCEDURE — 93312 ECHO TRANSESOPHAGEAL: CPT

## 2018-02-25 PROCEDURE — 93325 DOPPLER ECHO COLOR FLOW MAPG: CPT

## 2019-03-16 PROBLEM — I10 ESSENTIAL (PRIMARY) HYPERTENSION: Chronic | Status: ACTIVE | Noted: 2017-05-01

## 2019-03-16 PROBLEM — M10.9 GOUT, UNSPECIFIED: Chronic | Status: ACTIVE | Noted: 2017-05-01

## 2019-04-17 ENCOUNTER — OUTPATIENT (OUTPATIENT)
Dept: OUTPATIENT SERVICES | Facility: HOSPITAL | Age: 54
LOS: 1 days | End: 2019-04-17
Payer: COMMERCIAL

## 2019-04-17 VITALS
OXYGEN SATURATION: 98 % | DIASTOLIC BLOOD PRESSURE: 70 MMHG | RESPIRATION RATE: 16 BRPM | TEMPERATURE: 98 F | WEIGHT: 315 LBS | HEART RATE: 80 BPM | SYSTOLIC BLOOD PRESSURE: 110 MMHG | HEIGHT: 72 IN

## 2019-04-17 DIAGNOSIS — S46.001A UNSPECIFIED INJURY OF MUSCLE(S) AND TENDON(S) OF THE ROTATOR CUFF OF RIGHT SHOULDER, INITIAL ENCOUNTER: ICD-10-CM

## 2019-04-17 DIAGNOSIS — S46.001S UNSPECIFIED INJURY OF MUSCLE(S) AND TENDON(S) OF THE ROTATOR CUFF OF RIGHT SHOULDER, SEQUELA: ICD-10-CM

## 2019-04-17 DIAGNOSIS — G47.33 OBSTRUCTIVE SLEEP APNEA (ADULT) (PEDIATRIC): ICD-10-CM

## 2019-04-17 DIAGNOSIS — Z98.890 OTHER SPECIFIED POSTPROCEDURAL STATES: Chronic | ICD-10-CM

## 2019-04-17 LAB
ANION GAP SERPL CALC-SCNC: 7 MMOL/L — SIGNIFICANT CHANGE UP (ref 5–17)
APTT BLD: 29.6 SEC — SIGNIFICANT CHANGE UP (ref 27.5–36.3)
BUN SERPL-MCNC: 28 MG/DL — HIGH (ref 7–18)
CALCIUM SERPL-MCNC: 9.1 MG/DL — SIGNIFICANT CHANGE UP (ref 8.4–10.5)
CHLORIDE SERPL-SCNC: 103 MMOL/L — SIGNIFICANT CHANGE UP (ref 96–108)
CO2 SERPL-SCNC: 26 MMOL/L — SIGNIFICANT CHANGE UP (ref 22–31)
CREAT SERPL-MCNC: 1.33 MG/DL — HIGH (ref 0.5–1.3)
GLUCOSE SERPL-MCNC: 134 MG/DL — HIGH (ref 70–99)
HCT VFR BLD CALC: 41.3 % — SIGNIFICANT CHANGE UP (ref 39–50)
HGB BLD-MCNC: 13.9 G/DL — SIGNIFICANT CHANGE UP (ref 13–17)
INR BLD: 1.07 RATIO — SIGNIFICANT CHANGE UP (ref 0.88–1.16)
MCHC RBC-ENTMCNC: 32.6 PG — SIGNIFICANT CHANGE UP (ref 27–34)
MCHC RBC-ENTMCNC: 33.7 GM/DL — SIGNIFICANT CHANGE UP (ref 32–36)
MCV RBC AUTO: 96.7 FL — SIGNIFICANT CHANGE UP (ref 80–100)
NRBC # BLD: 0 /100 WBCS — SIGNIFICANT CHANGE UP (ref 0–0)
PLATELET # BLD AUTO: 222 K/UL — SIGNIFICANT CHANGE UP (ref 150–400)
POTASSIUM SERPL-MCNC: 3.9 MMOL/L — SIGNIFICANT CHANGE UP (ref 3.5–5.3)
POTASSIUM SERPL-SCNC: 3.9 MMOL/L — SIGNIFICANT CHANGE UP (ref 3.5–5.3)
PROTHROM AB SERPL-ACNC: 11.9 SEC — SIGNIFICANT CHANGE UP (ref 10–12.9)
RBC # BLD: 4.27 M/UL — SIGNIFICANT CHANGE UP (ref 4.2–5.8)
RBC # FLD: 12.9 % — SIGNIFICANT CHANGE UP (ref 10.3–14.5)
SODIUM SERPL-SCNC: 136 MMOL/L — SIGNIFICANT CHANGE UP (ref 135–145)
WBC # BLD: 9.05 K/UL — SIGNIFICANT CHANGE UP (ref 3.8–10.5)
WBC # FLD AUTO: 9.05 K/UL — SIGNIFICANT CHANGE UP (ref 3.8–10.5)

## 2019-04-17 PROCEDURE — 71046 X-RAY EXAM CHEST 2 VIEWS: CPT | Mod: 26

## 2019-04-17 PROCEDURE — 80048 BASIC METABOLIC PNL TOTAL CA: CPT

## 2019-04-17 PROCEDURE — 85027 COMPLETE CBC AUTOMATED: CPT

## 2019-04-17 PROCEDURE — 36415 COLL VENOUS BLD VENIPUNCTURE: CPT

## 2019-04-17 PROCEDURE — 85610 PROTHROMBIN TIME: CPT

## 2019-04-17 PROCEDURE — G0463: CPT

## 2019-04-17 PROCEDURE — 71046 X-RAY EXAM CHEST 2 VIEWS: CPT

## 2019-04-17 PROCEDURE — 85730 THROMBOPLASTIN TIME PARTIAL: CPT

## 2019-04-17 RX ORDER — PREGABALIN 225 MG/1
1 CAPSULE ORAL
Qty: 0 | Refills: 0 | COMMUNITY

## 2019-04-17 RX ORDER — ERGOCALCIFEROL 1.25 MG/1
1 CAPSULE ORAL
Qty: 0 | Refills: 0 | COMMUNITY

## 2019-04-17 NOTE — H&P PST ADULT - NSICDXPASTMEDICALHX_GEN_ALL_CORE_FT
PAST MEDICAL HISTORY:  Atrial fibrillation dx in 2017,currently on Eliquis and ASA 81 mg  and under Cardiology care, to stop Eliquis 1 day before sx and continue ASA 81 mg as per Cardiology , Dr. Rodriguez, and Surgeon , Dr. Karla Bowers aware.       Gout     HLD (hyperlipidemia) treated  with diet    Hypertension     Obesity, morbid BMI- 43.3    CAMILLE (obstructive sleep apnea) SANDIP RISK FOR score 5 , pt advised to see pulmonology for sleep studies assesment- airway II    Right knee pain     Unspecified injury of muscle(s) and tendon(s) of the rotator cuff of right shoulder, initial encounter     Vitamin D deficiency hx of

## 2019-04-17 NOTE — H&P PST ADULT - NSICDXFAMILYHX_GEN_ALL_CORE_FT
10/06/23              [place] [facility] [address]            Dear Octavio Justin,    Good morning! We have been trying to reach you for 3 months on test results. It looks like messages have been left but we have never received a call back. Your tests were negative. Please call the office if you have any questions or concerns. Have a wonderful day!     The Staff of Dr. Abhi Buchanan
FAMILY HISTORY:  FH: heart attack, maternal and paternal grandparents -  at age 58

## 2019-04-17 NOTE — H&P PST ADULT - HISTORY OF PRESENT ILLNESS
54 years old male presented with right shoulder pain and limited ROM , s/p fall at work on December 2016. Diagnosed with injury of muscles and tendons of  right rotator cuff of right shoulder and is  scheduled for right shoulder arthroscopy on 4/26/19. Pt was scheduled for 2017 and developed afib in operating room , currently on Eliquis and ASA 81 mg  and under Cardiology care, to stop Eliquis 1 day before sx and continue ASA 81 mg as per Cardiology , Dr. Rodriguez, and Surgeon , Dr. Karla Bowers aware.

## 2019-04-17 NOTE — H&P PST ADULT - MUSCULOSKELETAL
detailed exam no calf tenderness/no joint swelling/right shoulder, right knee- both tenderness to palpation/no joint erythema/no joint warmth/decreased ROM due to pain details…

## 2019-04-17 NOTE — H&P PST ADULT - ASSESSMENT
54 years old male presented with injury of muscles and tendons of  right rotator cuff of right shoulder .

## 2019-04-17 NOTE — H&P PST ADULT - NSANTHOSAYNRD_GEN_A_CORE
pt advised to see pulmonology to be assessed for sleep apnea, pt seen By Anesthesia, Dr. Lee, she discussed risk factors for CAMILLE with patient/No. CAMILLE screening performed.  STOP BANG Legend: 0-2 = LOW Risk; 3-4 = INTERMEDIATE Risk; 5-8 = HIGH Risk

## 2019-04-17 NOTE — H&P PST ADULT - NSICDXPROBLEM_GEN_ALL_CORE_FT
PROBLEM DIAGNOSES  Problem: Unspecified injury of muscle(s) and tendon(s) of the rotator cuff of right shoulder, initial encounter  Assessment and Plan: Patient is scheduled  for right shoulder arthroscopy on 4/26/19.     Problem: CAMILLE (obstructive sleep apnea)  Assessment and Plan: High risk for , airway II, but large neck, morbid obesity, HTN, male gender, age over 50 ( stop bang scale)- pt advised to f/u with pulmonology for sleep studies, to monitor pt closely perioperatively

## 2019-04-17 NOTE — H&P PST ADULT - MS GEN HX ROS MEA POS PC
arthralgia/arthritis/leg pain R/arm pain R right elbow limited flexion ( due to trauma in childhood)/arthritis/arm pain R/leg pain R/arthralgia

## 2019-04-17 NOTE — H&P PST ADULT - NEGATIVE GASTROINTESTINAL SYMPTOMS
no flatulence/no diarrhea/no abdominal pain/no constipation/no change in bowel habits/no nausea/no vomiting

## 2019-04-17 NOTE — H&P PST ADULT - DOES PATIENT HAVE ADVANCE DIRECTIVE
No/in case of emergency call sister Dunia [phone], she is to make medical decisions about patient

## 2019-04-17 NOTE — H&P PST ADULT - NEGATIVE CARDIOVASCULAR SYMPTOMS
no palpitations/no dyspnea on exertion/no peripheral edema/no paroxysmal nocturnal dyspnea/no claudication/no chest pain/no orthopnea

## 2019-04-17 NOTE — H&P PST ADULT - NEGATIVE OPHTHALMOLOGIC SYMPTOMS
no lacrimation R/no blurred vision R/no diplopia/no blurred vision L/no lacrimation L/no photophobia

## 2019-04-17 NOTE — H&P PST ADULT - RS GEN PE MLT RESP DETAILS PC
breath sounds equal/respirations non-labored/no rhonchi/normal/airway patent/clear to auscultation bilaterally/no rales/no wheezes

## 2019-04-17 NOTE — H&P PST ADULT - NEGATIVE GENERAL GENITOURINARY SYMPTOMS
no bladder infections/normal urinary frequency/no nocturia/no urinary hesitancy/no dysuria/no gas in urine/no hematuria/no flank pain L/no flank pain R/no urine discoloration/no incontinence/no renal colic

## 2019-04-18 PROBLEM — E55.9 VITAMIN D DEFICIENCY, UNSPECIFIED: Chronic | Status: ACTIVE | Noted: 2017-05-01

## 2019-04-18 PROBLEM — E78.5 HYPERLIPIDEMIA, UNSPECIFIED: Chronic | Status: ACTIVE | Noted: 2017-05-01

## 2019-04-18 PROBLEM — G47.33 OBSTRUCTIVE SLEEP APNEA (ADULT) (PEDIATRIC): Chronic | Status: ACTIVE | Noted: 2017-05-01

## 2019-04-18 PROBLEM — E53.8 DEFICIENCY OF OTHER SPECIFIED B GROUP VITAMINS: Chronic | Status: INACTIVE | Noted: 2017-05-01 | Resolved: 2019-04-17

## 2019-04-18 PROBLEM — E66.01 MORBID (SEVERE) OBESITY DUE TO EXCESS CALORIES: Chronic | Status: INACTIVE | Noted: 2017-05-01 | Resolved: 2019-04-17

## 2019-04-26 RX ORDER — DILTIAZEM HCL 120 MG
1 CAPSULE, EXT RELEASE 24 HR ORAL
Qty: 0 | Refills: 0 | COMMUNITY

## 2019-04-26 RX ORDER — METOPROLOL TARTRATE 50 MG
1 TABLET ORAL
Qty: 0 | Refills: 0 | COMMUNITY

## 2022-07-30 NOTE — PATIENT PROFILE ADULT. - PRO MENTAL HEALTH SX RECENT
Alert-The patient is alert, awake and responds to voice. The patient is oriented to time, place, and person. The triage nurse is able to obtain subjective information. Yes - the patient is able to be screened none

## 2023-07-25 NOTE — PATIENT PROFILE ADULT. - PRO ARRIVE FROM
[FreeTextEntry1] : 73 yo P3 menopause , annual exam , intermittent llq pelvic pain, r/o gi ethiology\par pelvic sono- small myoma\par  home

## 2024-05-08 NOTE — ASU PATIENT PROFILE, ADULT - NS TRANSFER DENTURES
This patient does have evidence of infective focus  My overall impression is sepsis.  Source: Urinary Tract    Antibiotics given-   Antibiotics (72h ago, onward)      Start     Stop Route Frequency Ordered    05/07/24 2252  meropenem (MERREM) 1 g in sodium chloride 0.9 % 100 mL IVPB (MB+)         -- IV Every 24 hours (non-standard times) 05/07/24 2233          Latest lactate reviewed-  Recent Labs   Lab 05/07/24 2104 05/07/24  2125   LACTATE 1.3  --    POCLAC  --  1.42     Organ dysfunction indicated by Encephalopathy    Fluid challenge Contraindicated- Fluid bolus is contraindicated in this patient due to End Stage Renal Disease     Post- resuscitation assessment No - Post resuscitation assessment not needed     Will Not start Pressors- Levophed for MAP of 65  Source control achieved by:     -- Continue BSA   -- Follow up infectious work up   -- MAP > 65   none

## 2024-09-05 ENCOUNTER — APPOINTMENT (OUTPATIENT)
Dept: DERMATOLOGY | Facility: CLINIC | Age: 59
End: 2024-09-05

## 2024-11-25 NOTE — H&P PST ADULT - RESPIRATORY
25-Nov-2024 01:23 25-Nov-2024 05:55 Breath Sounds equal & clear to percussion & auscultation, no accessory muscle use

## 2025-03-27 PROCEDURE — HZ2ZZZZ DETOXIFICATION SERVICES FOR SUBSTANCE ABUSE TREATMENT: ICD-10-PCS | Performed by: ALLERGY & IMMUNOLOGY

## 2025-03-28 ENCOUNTER — HOSPITAL ENCOUNTER (INPATIENT)
Dept: HOSPITAL 74 - YASAS | Age: 60
LOS: 5 days | Discharge: HOME | End: 2025-04-02
Attending: ALLERGY & IMMUNOLOGY | Admitting: ALLERGY & IMMUNOLOGY
Payer: COMMERCIAL

## 2025-03-28 VITALS — BODY MASS INDEX: 38.6 KG/M2

## 2025-03-28 DIAGNOSIS — Z99.89: ICD-10-CM

## 2025-03-28 DIAGNOSIS — I50.9: ICD-10-CM

## 2025-03-28 DIAGNOSIS — M10.9: ICD-10-CM

## 2025-03-28 DIAGNOSIS — I11.0: ICD-10-CM

## 2025-03-28 DIAGNOSIS — Z88.1: ICD-10-CM

## 2025-03-28 DIAGNOSIS — F32.A: ICD-10-CM

## 2025-03-28 DIAGNOSIS — I48.91: ICD-10-CM

## 2025-03-28 DIAGNOSIS — F10.230: Primary | ICD-10-CM

## 2025-03-28 DIAGNOSIS — M17.10: ICD-10-CM

## 2025-03-28 RX ADMIN — Medication SCH MG: at 22:09

## 2025-03-28 RX ADMIN — LEVETIRACETAM SCH MG: 500 TABLET, FILM COATED ORAL at 22:09

## 2025-03-28 RX ADMIN — Medication SCH MG: at 22:10

## 2025-03-29 LAB
ALP SERPL-CCNC: 100 U/L (ref 45–117)
ALT SERPL-CCNC: 16 U/L (ref 13–61)
ANION GAP SERPL CALC-SCNC: 10 MMOL/L (ref 4–13)
AST SERPL-CCNC: 20 U/L (ref 15–37)
BILIRUB SERPL-MCNC: 1.4 MG/DL (ref 0.2–1)
BUN SERPL-MCNC: 25.3 MG/DL (ref 7–18)
CALCIUM SERPL-MCNC: 8.3 MG/DL (ref 8.5–10.1)
CHLORIDE SERPL-SCNC: 102 MMOL/L (ref 98–107)
CO2 SERPL-SCNC: 25 MMOL/L (ref 21–32)
CREAT SERPL-MCNC: 0.8 MG/DL (ref 0.55–1.3)
ERYTHROCYTE [DISTWIDTH] IN BLOOD: 14.4 % (ref 12.2–16.4)
GLUCOSE SERPL-MCNC: 97 MG/DL (ref 74–106)
HCT VFR BLD CALC: 34.7 % (ref 40.1–51)
HGB BLD-MCNC: 11.1 G/DL (ref 13.7–17.5)
MCV RBC: 103.6 FL (ref 79–92.2)
PLATELET # BLD AUTO: 184 X10^3/UL (ref 163–337)
PMV BLD: 9.8 FL (ref 9.4–12.4)
POTASSIUM SERPLBLD-SCNC: 3.7 MMOL/L (ref 3.5–5.1)
PROT SERPL-MCNC: 6.5 G/DL (ref 6.4–8.2)
SODIUM SERPL-SCNC: 137 MMOL/L (ref 136–145)

## 2025-03-29 RX ADMIN — SPIRONOLACTONE SCH MG: 25 TABLET, FILM COATED ORAL at 11:43

## 2025-03-29 RX ADMIN — Medication SCH TAB: at 10:28

## 2025-03-29 RX ADMIN — APIXABAN SCH MG: 5 TABLET, FILM COATED ORAL at 13:48

## 2025-03-29 RX ADMIN — ALLOPURINOL SCH MG: 300 TABLET ORAL at 13:49

## 2025-03-29 RX ADMIN — MUPIROCIN SCH APPLIC: 20 OINTMENT TOPICAL at 13:48

## 2025-03-29 RX ADMIN — FUROSEMIDE SCH MG: 20 TABLET ORAL at 13:48

## 2025-03-30 RX ADMIN — ACETAMINOPHEN PRN MG: 325 TABLET ORAL at 17:22

## 2025-04-02 VITALS
RESPIRATION RATE: 18 BRPM | TEMPERATURE: 97.8 F | SYSTOLIC BLOOD PRESSURE: 130 MMHG | DIASTOLIC BLOOD PRESSURE: 67 MMHG | HEART RATE: 86 BPM